# Patient Record
Sex: MALE | Race: BLACK OR AFRICAN AMERICAN | NOT HISPANIC OR LATINO | Employment: FULL TIME | URBAN - METROPOLITAN AREA
[De-identification: names, ages, dates, MRNs, and addresses within clinical notes are randomized per-mention and may not be internally consistent; named-entity substitution may affect disease eponyms.]

---

## 2021-09-04 ENCOUNTER — HOSPITAL ENCOUNTER (EMERGENCY)
Facility: HOSPITAL | Age: 21
Discharge: HOME | End: 2021-09-04
Attending: EMERGENCY MEDICINE

## 2021-09-04 ENCOUNTER — APPOINTMENT (EMERGENCY)
Dept: RADIOLOGY | Facility: HOSPITAL | Age: 21
End: 2021-09-04

## 2021-09-04 VITALS
HEIGHT: 71 IN | BODY MASS INDEX: 20.3 KG/M2 | SYSTOLIC BLOOD PRESSURE: 141 MMHG | TEMPERATURE: 97.8 F | OXYGEN SATURATION: 99 % | WEIGHT: 145 LBS | DIASTOLIC BLOOD PRESSURE: 87 MMHG | RESPIRATION RATE: 18 BRPM | HEART RATE: 83 BPM

## 2021-09-04 DIAGNOSIS — M54.6 ACUTE LEFT-SIDED THORACIC BACK PAIN: Primary | ICD-10-CM

## 2021-09-04 PROCEDURE — 96372 THER/PROPH/DIAG INJ SC/IM: CPT

## 2021-09-04 PROCEDURE — 99283 EMERGENCY DEPT VISIT LOW MDM: CPT

## 2021-09-04 PROCEDURE — 99284 EMERGENCY DEPT VISIT MOD MDM: CPT | Performed by: PHYSICIAN ASSISTANT

## 2021-09-04 PROCEDURE — 71045 X-RAY EXAM CHEST 1 VIEW: CPT

## 2021-09-04 RX ORDER — METHOCARBAMOL 500 MG/1
500 TABLET, FILM COATED ORAL ONCE
Status: COMPLETED | OUTPATIENT
Start: 2021-09-04 | End: 2021-09-04

## 2021-09-04 RX ORDER — KETOROLAC TROMETHAMINE 30 MG/ML
15 INJECTION, SOLUTION INTRAMUSCULAR; INTRAVENOUS ONCE
Status: COMPLETED | OUTPATIENT
Start: 2021-09-04 | End: 2021-09-04

## 2021-09-04 RX ORDER — NAPROXEN 500 MG/1
500 TABLET ORAL 2 TIMES DAILY WITH MEALS
Qty: 30 TABLET | Refills: 0 | Status: SHIPPED | OUTPATIENT
Start: 2021-09-04

## 2021-09-04 RX ORDER — METHOCARBAMOL 500 MG/1
500 TABLET, FILM COATED ORAL 2 TIMES DAILY
Qty: 20 TABLET | Refills: 0 | Status: SHIPPED | OUTPATIENT
Start: 2021-09-04

## 2021-09-04 RX ORDER — LIDOCAINE 50 MG/G
2 PATCH TOPICAL ONCE
Status: DISCONTINUED | OUTPATIENT
Start: 2021-09-04 | End: 2021-09-05 | Stop reason: HOSPADM

## 2021-09-04 RX ADMIN — LIDOCAINE 5% 2 PATCH: 700 PATCH TOPICAL at 21:37

## 2021-09-04 RX ADMIN — METHOCARBAMOL 500 MG: 500 TABLET ORAL at 21:37

## 2021-09-04 RX ADMIN — KETOROLAC TROMETHAMINE 15 MG: 30 INJECTION, SOLUTION INTRAMUSCULAR; INTRAVENOUS at 21:37

## 2021-09-05 NOTE — ED PROVIDER NOTES
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 that 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  He did not have any falls  He is not complaining of any fevers, chills, chest pain, difficulty breathing, saddle anesthesia, urinary/bowel incontinence  No signs of cauda equina  No other symptoms at this time  None       History reviewed  No pertinent past medical history  History reviewed  No pertinent surgical history  History reviewed  No pertinent family history  I have reviewed and agree with the history as documented  E-Cigarette/Vaping     E-Cigarette/Vaping Substances     Social History     Tobacco Use    Smoking status: Never Smoker    Smokeless tobacco: Never Used   Substance Use Topics    Alcohol use: Not Currently    Drug use: Not Currently       Review of Systems   Constitutional: Negative for chills, diaphoresis and fever  HENT: Negative for congestion, drooling, facial swelling, nosebleeds, sore throat and voice change  Eyes: Negative for discharge and redness  Respiratory: Negative for cough, choking, chest tightness, shortness of breath and stridor  Cardiovascular: Negative for chest pain and palpitations  Gastrointestinal: Negative for abdominal pain, diarrhea, nausea and vomiting  Genitourinary: Negative for decreased urine volume, difficulty urinating, dysuria, flank pain, frequency and urgency  Musculoskeletal: Positive for back pain  Negative for arthralgias, neck pain and neck stiffness  Skin: Negative for color change, rash and wound     Neurological: Negative for dizziness, syncope, facial asymmetry, weakness, light-headedness, numbness and headaches  Psychiatric/Behavioral: Negative for confusion and suicidal ideas  The patient is not nervous/anxious  All other systems reviewed and are negative  Physical Exam  Physical Exam  Vitals reviewed  Constitutional:       General: He is not in acute distress  Appearance: Normal appearance  He is normal weight  He is not ill-appearing, toxic-appearing or diaphoretic  HENT:      Head: Normocephalic and atraumatic  Right Ear: External ear normal       Left Ear: External ear normal       Mouth/Throat:      Mouth: Mucous membranes are moist       Pharynx: Oropharynx is clear  No oropharyngeal exudate or posterior oropharyngeal erythema  Eyes:      General: No scleral icterus  Right eye: No discharge  Left eye: No discharge  Extraocular Movements: Extraocular movements intact  Conjunctiva/sclera: Conjunctivae normal       Pupils: Pupils are equal, round, and reactive to light  Cardiovascular:      Rate and Rhythm: Normal rate and regular rhythm  Pulses: Normal pulses  Heart sounds: Normal heart sounds  No murmur heard  No friction rub  No gallop  Pulmonary:      Effort: Pulmonary effort is normal  No respiratory distress  Breath sounds: Normal breath sounds  No stridor  No wheezing, rhonchi or rales  Abdominal:      General: Abdomen is flat  Palpations: Abdomen is soft  Tenderness: There is no abdominal tenderness  There is no right CVA tenderness, left CVA tenderness, guarding or rebound  Musculoskeletal:      Cervical back: Normal range of motion and neck supple  Thoracic back: Tenderness (Left paraspinal muscles) present  Decreased range of motion  Right lower leg: No edema  Left lower leg: No edema  Skin:     General: Skin is warm and dry  Capillary Refill: Capillary refill takes less than 2 seconds  Neurological:      General: No focal deficit present        Mental Status: He is alert and oriented to person, place, and time  Psychiatric:         Mood and Affect: Mood normal          Behavior: Behavior normal          Vital Signs  ED Triage Vitals [09/04/21 2000]   Temperature Pulse Respirations Blood Pressure SpO2   97 8 °F (36 6 °C) 83 18 141/87 99 %      Temp Source Heart Rate Source Patient Position - Orthostatic VS BP Location FiO2 (%)   Temporal Monitor Sitting Left arm --      Pain Score       4           Vitals:    09/04/21 2000   BP: 141/87   Pulse: 83   Patient Position - Orthostatic VS: Sitting         Visual Acuity  Visual Acuity      Most Recent Value   L Pupil Size (mm)  3   R Pupil Size (mm)  3          ED Medications  Medications   lidocaine (LIDODERM) 5 % patch 2 patch (2 patches Topical Medication Applied 9/4/21 2137)   methocarbamol (ROBAXIN) tablet 500 mg (500 mg Oral Given 9/4/21 2137)   ketorolac (TORADOL) injection 15 mg (15 mg Intramuscular Given 9/4/21 2137)       Diagnostic Studies  Results Reviewed     None                 XR chest 1 view portable    (Results Pending)              Procedures  Procedures         ED Course                                           MDM  Number of Diagnoses or Management Options  Acute left-sided thoracic back pain  Diagnosis management comments: Patient is a 81-year-old male with no significant past medical history who presents to the emergency department for evaluation of left-sided thoracic back pain for the past month  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 that 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  He did not have any falls  He is not complaining of any fevers, chills, chest pain, difficulty breathing, saddle anesthesia, urinary/bowel incontinence  No signs of cauda equina  No other symptoms at this time  Patient appears comfortable in bed  He is not any acute distress    His vital signs are normal   Physical exam remarkable for left posterior chest wall tenderness as well as left thoracic paraspinal muscle tenderness  Remainder of physical exam unremarkable  Patient given Toradol, Robaxin, Lidoderm patch in the emergency department with moderate relief  Patient given prescription for naproxen and Robaxin  He was given referral to our [facility] program   Given very strict instructions on when to return to the emergency department  Patient stable at this time  Amount and/or Complexity of Data Reviewed  Tests in the radiology section of CPT®: ordered and reviewed    Patient Progress  Patient progress: stable      Disposition  Final diagnoses:   Acute left-sided thoracic back pain     Time reflects when diagnosis was documented in both MDM as applicable and the Disposition within this note     Time User Action Codes Description Comment    9/4/2021 10:12 PM Glenys Opitz Add [M54 6] Acute left-sided thoracic back pain       ED Disposition     ED Disposition Condition Date/Time Comment    Discharge Stable Sat Sep 4, 2021 10:12 PM Holly Whipple discharge to home/self care              Follow-up Information     Follow up With Specialties Details Why Contact Info Additional [address] [facility] Emergency Department Emergency Medicine Go to  If symptoms worsen [id number] [facility]  [id number] [place] [address] Emergency Department, [address] [place] [address], [id number]          Discharge Medication List as of 9/4/2021 10:15 PM      START taking these medications    Details   methocarbamol (ROBAXIN) 500 mg tablet Take 1 tablet (500 mg total) by mouth 2 (two) times a day, Starting Sat 9/4/2021, Normal      naproxen (NAPROSYN) 500 mg tablet Take 1 tablet (500 mg total) by mouth 2 (two) times a day with meals, Starting Sat 9/4/2021, Normal               PDMP Review     None          ED Provider  Electronically Signed by           Husam Baron PA-C  09/04/21 9791

## 2021-09-09 ENCOUNTER — TELEPHONE (OUTPATIENT)
Dept: PHYSICAL THERAPY | Facility: OTHER | Age: 21
End: 2021-09-09

## 2021-09-09 NOTE — TELEPHONE ENCOUNTER
Attempted to call patient per [facility] referral but unable to leave a voice message due to Voice Mailbox not set up  This is the 1st attempt to reach the patient  Will defer per protocol

## 2021-11-06 ENCOUNTER — HOSPITAL ENCOUNTER (EMERGENCY)
Facility: HOSPITAL | Age: 21
Discharge: HOME | End: 2021-11-06
Attending: EMERGENCY MEDICINE
Payer: COMMERCIAL

## 2021-11-06 VITALS
RESPIRATION RATE: 18 BRPM | OXYGEN SATURATION: 99 % | WEIGHT: 145 LBS | SYSTOLIC BLOOD PRESSURE: 135 MMHG | TEMPERATURE: 98.7 F | HEIGHT: 71 IN | BODY MASS INDEX: 20.3 KG/M2 | DIASTOLIC BLOOD PRESSURE: 66 MMHG | HEART RATE: 75 BPM

## 2021-11-06 DIAGNOSIS — R04.0 RECURRENT EPISTAXIS: Primary | ICD-10-CM

## 2021-11-06 PROCEDURE — 99283 EMERGENCY DEPT VISIT LOW MDM: CPT

## 2021-11-06 PROCEDURE — U0003 INFECTIOUS AGENT DETECTION BY NUCLEIC ACID (DNA OR RNA); SEVERE ACUTE RESPIRATORY SYNDROME CORONAVIRUS 2 (SARS-COV-2) (CORONAVIRUS DISEASE [COVID-19]), AMPLIFIED PROBE TECHNIQUE, MAKING USE OF HIGH THROUGHPUT TECHNOLOGIES AS DESCRIBED BY CMS-2020-01-R: HCPCS | Performed by: PHYSICIAN ASSISTANT

## 2021-11-06 PROCEDURE — 99284 EMERGENCY DEPT VISIT MOD MDM: CPT | Performed by: PHYSICIAN ASSISTANT

## 2021-11-06 PROCEDURE — U0005 INFEC AGEN DETEC AMPLI PROBE: HCPCS | Performed by: PHYSICIAN ASSISTANT

## 2021-11-06 RX ORDER — OXYMETAZOLINE HYDROCHLORIDE 0.05 G/100ML
2 SPRAY NASAL 2 TIMES DAILY
Qty: 30 ML | Refills: 0 | Status: SHIPPED | OUTPATIENT
Start: 2021-11-06

## 2021-11-07 LAB — SARS-COV-2 RNA RESP QL NAA+PROBE: NEGATIVE

## 2022-05-24 ENCOUNTER — HOSPITAL ENCOUNTER (EMERGENCY)
Facility: HOSPITAL | Age: 22
Discharge: HOME | End: 2022-05-25
Attending: EMERGENCY MEDICINE | Admitting: EMERGENCY MEDICINE
Payer: COMMERCIAL

## 2022-05-24 VITALS
DIASTOLIC BLOOD PRESSURE: 62 MMHG | HEART RATE: 68 BPM | OXYGEN SATURATION: 99 % | RESPIRATION RATE: 19 BRPM | SYSTOLIC BLOOD PRESSURE: 118 MMHG | TEMPERATURE: 97.5 F

## 2022-05-24 DIAGNOSIS — J02.9 PHARYNGITIS: Primary | ICD-10-CM

## 2022-05-24 PROCEDURE — 99284 EMERGENCY DEPT VISIT MOD MDM: CPT | Performed by: EMERGENCY MEDICINE

## 2022-05-24 PROCEDURE — 0241U HB NFCT DS VIR RESP RNA 4 TRGT: CPT | Performed by: PHYSICIAN ASSISTANT

## 2022-05-24 PROCEDURE — 99283 EMERGENCY DEPT VISIT LOW MDM: CPT

## 2022-05-24 NOTE — Clinical Note
Ajith Hope was seen and treated in our emergency department on 5/24/2022  Diagnosis:     Jay Spaulding  may return to work on return date  He may return on this date: 05/26/2022         If you have any questions or concerns, please don't hesitate to call        Lucas Alvarado MD    ______________________________           _______________          _______________  Hospital Representative                              Date                                Time

## 2022-05-24 NOTE — Clinical Note
Nancy Butchmax was seen and treated in our emergency department on 5/24/2022  Diagnosis:     Catarina Lezama  may return to work on return date  He may return on this date: 05/26/2022         If you have any questions or concerns, please don't hesitate to call        Lady Ni RN    ______________________________           _______________          _______________  [facility] Representative                              Date                                Time

## 2022-05-25 LAB
FLUAV RNA RESP QL NAA+PROBE: NEGATIVE
FLUBV RNA RESP QL NAA+PROBE: NEGATIVE
RSV RNA RESP QL NAA+PROBE: NEGATIVE
S PYO DNA THROAT QL NAA+PROBE: NOT DETECTED
SARS-COV-2 RNA RESP QL NAA+PROBE: NEGATIVE

## 2022-05-25 PROCEDURE — 87651 STREP A DNA AMP PROBE: CPT | Performed by: EMERGENCY MEDICINE

## 2022-05-25 RX ORDER — AMOXICILLIN 250 MG/1
500 CAPSULE ORAL ONCE
Status: COMPLETED | OUTPATIENT
Start: 2022-05-25 | End: 2022-05-25

## 2022-05-25 RX ORDER — AMOXICILLIN 500 MG/1
500 CAPSULE ORAL 3 TIMES DAILY
Qty: 21 CAPSULE | Refills: 0 | Status: SHIPPED | OUTPATIENT
Start: 2022-05-25 | End: 2022-06-01

## 2022-05-25 RX ADMIN — DEXAMETHASONE SODIUM PHOSPHATE 10 MG: 10 INJECTION, SOLUTION INTRAMUSCULAR; INTRAVENOUS at 00:24

## 2022-05-25 RX ADMIN — AMOXICILLIN 500 MG: 250 CAPSULE ORAL at 00:24

## 2022-05-25 NOTE — ED PROVIDER NOTES
History  Chief Complaint   Patient presents with    Sore Throat     Sore throat for several days with headache and "my neck feels stiff"  History provided by:  Patient   used: No    Sore Throat  Location:  Generalized  Quality:  Aching  Severity:  Moderate  Onset quality:  Gradual  Timing:  Constant  Progression:  Worsening  Chronicity:  New  Relieved by:  Nothing  Worsened by:  Nothing  Ineffective treatments:  None tried  Associated symptoms: no abdominal pain, no chest pain, no chills, no cough, no ear pain, no fever, no rash and no shortness of breath        Prior to Admission Medications   Prescriptions Last Dose Informant Patient Reported? Taking? methocarbamol (ROBAXIN) 500 mg tablet   No No   Sig: Take 1 tablet (500 mg total) by mouth 2 (two) times a day   naproxen (NAPROSYN) 500 mg tablet   No No   Sig: Take 1 tablet (500 mg total) by mouth 2 (two) times a day with meals   oxymetazoline (AFRIN) 0 05 % nasal spray   No No   Si sprays by Each Nare route 2 (two) times a day   sodium chloride (OCEAN) 0 65 % nasal spray   No No   Si spray into each nostril as needed for congestion      Facility-Administered Medications: None       Past Medical History:   Diagnosis Date    Scoliosis        History reviewed  No pertinent surgical history  History reviewed  No pertinent family history  I have reviewed and agree with the history as documented  E-Cigarette/Vaping    E-Cigarette Use Never User      E-Cigarette/Vaping Substances     Social History     Tobacco Use    Smoking status: Never Smoker    Smokeless tobacco: Never Used   Vaping Use    Vaping Use: Never used   Substance Use Topics    Alcohol use: Not Currently    Drug use: Not Currently       Review of Systems   Constitutional: Negative for chills and fever  HENT: Positive for sore throat  Negative for ear pain  Eyes: Negative for pain and visual disturbance     Respiratory: Negative for cough and shortness of breath  Cardiovascular: Negative for chest pain and palpitations  Gastrointestinal: Negative for abdominal pain, nausea and vomiting  Genitourinary: Negative for dysuria and hematuria  Musculoskeletal: Negative for arthralgias and back pain  Skin: Negative for color change and rash  Neurological: Negative for seizures and syncope  All other systems reviewed and are negative  Physical Exam  Physical Exam  Vitals and nursing note reviewed  Constitutional:       Appearance: He is well-developed  HENT:      Head: Normocephalic and atraumatic  Mouth/Throat: Tonsils: Tonsillar exudate present  1+ on the right  1+ on the left  Eyes:      Conjunctiva/sclera: Conjunctivae normal    Cardiovascular:      Rate and Rhythm: Normal rate and regular rhythm  Heart sounds: No murmur heard  Pulmonary:      Effort: Pulmonary effort is normal  No respiratory distress  Breath sounds: Normal breath sounds  Abdominal:      Palpations: Abdomen is soft  Tenderness: There is no abdominal tenderness  Musculoskeletal:      Cervical back: Neck supple  Skin:     General: Skin is warm and dry  Capillary Refill: Capillary refill takes less than 2 seconds  Neurological:      General: No focal deficit present  Mental Status: He is alert and oriented to person, place, and time           Vital Signs  ED Triage Vitals [05/24/22 2331]   Temperature Pulse Respirations Blood Pressure SpO2   97 5 °F (36 4 °C) 68 19 118/62 99 %      Temp Source Heart Rate Source Patient Position - Orthostatic VS BP Location FiO2 (%)   Temporal Monitor Sitting Left arm --      Pain Score       --           Vitals:    05/24/22 2331   BP: 118/62   Pulse: 68   Patient Position - Orthostatic VS: Sitting         Visual Acuity      ED Medications  Medications   dexamethasone oral liquid 10 mg 1 mL (10 mg Oral Given 5/25/22 0024)   amoxicillin (AMOXIL) capsule 500 mg (500 mg Oral Given 5/25/22 0024) Diagnostic Studies  Results Reviewed     Procedure Component Value Units Date/Time    Strep A PCR [[id number]]  (Normal) Collected: 05/25/22 0026    Lab Status: Final result Specimen: Throat Updated: 05/25/22 0056     STREP A PCR Not Detected    COVID/FLU/RSV [[id number]]  (Normal) Collected: 05/24/22 2333    Lab Status: Final result Specimen: Nares from Nose Updated: 05/25/22 0014     SARS-CoV-2 Negative     INFLUENZA A PCR Negative     INFLUENZA B PCR Negative     RSV PCR Negative    Narrative:      FOR PEDIATRIC PATIENTS - copy/paste COVID Guidelines URL to browser: [email or web address]    SARS-CoV-2 assay is a Nucleic Acid Amplification assay intended for the  qualitative detection of nucleic acid from SARS-CoV-2 in nasopharyngeal  swabs  Results are for the presumptive identification of SARS-CoV-2 RNA  Positive results are indicative of infection with SARS-CoV-2, the virus  causing COVID-19, but do not rule out bacterial infection or co-infection  with other viruses  Laboratories within the [place] and its  territories are required to report all positive results to the appropriate  public health authorities  Negative results do not preclude SARS-CoV-2  infection and should not be used as the sole basis for treatment or other  patient management decisions  Negative results must be combined with  clinical observations, patient history, and epidemiological information  This test has not been FDA cleared or approved  This test has been authorized by FDA under an Emergency Use Authorization  (EUA)  This test is only authorized for the duration of time the  declaration that circumstances exist justifying the authorization of the  emergency use of an in vitro diagnostic tests for detection of SARS-CoV-2  virus and/or diagnosis of COVID-19 infection under section 564(b)(1) of  the Act, 21 U  S C  386PDJ-6(M)(1), unless the authorization is terminated  or revoked sooner  The test has been validated but independent review by FDA  and CLIA is pending  Test performed using [employer] Gene[employer]pert: This RT-PCR assay targets N2,  a region unique to SARS-CoV-2  A conserved region in the E-gene was chosen  for pan-Sarbecovirus detection which includes SARS-CoV-2  No orders to display              Procedures  Procedures         ED Course                               SBIRT 20yo+    Flowsheet Row Most Recent Value   SBIRT (23 yo +)    In order to provide better care to our patients, we are screening all of our patients for alcohol and drug use  Would it be okay to ask you these screening questions? Yes Filed at: 05/25/2022 0010   Initial Alcohol Screen: US AUDIT-C     1  How often do you have a drink containing alcohol? 2 Filed at: 05/25/2022 0010   2  How many drinks containing alcohol do you have on a typical day you are drinking? 1 Filed at: 05/25/2022 0010   3a  Male UNDER 65: How often do you have five or more drinks on one occasion? 0 Filed at: 05/25/2022 0010   Audit-C Score 3 Filed at: 05/25/2022 0010   LETHA: How many times in the past year have you    Used an illegal drug or used a prescription medication for non-medical reasons? Never Filed at: 05/25/2022 0010                    MDM  Number of Diagnoses or Management Options  Pharyngitis: new and requires workup  Diagnosis management comments: 28-year-old male presented for evaluation of sore throat for a few days  Reports a mild discomfort with swallowing but no difficulty handling secretions or liquids  no shortness of breath or cp  Pt unsure of fevers  Ill-appearing on exam, reassuring vital signs  COVID and flu negative  Will plan to treat for strep pharyngitis given exudates and lymphadenopathy on exam   Discussed PCP follow-up and return precautions        Disposition  Final diagnoses:   Pharyngitis     Time reflects when diagnosis was documented in both MDM as applicable and the Disposition within this note     Time User Action Codes Description Comment    5/25/2022 12:39 AM Deandre Godoy Add [J02 9] Pharyngitis       ED Disposition     ED Disposition   Discharge    Condition   Stable    Date/Time   Wed May 25, 2022 [address] discharge to home/self care  Follow-up Information     Follow up With Specialties Details Why Jeanine Tyler MD Internal Medicine   [facility] [address]  [phone]            Discharge Medication List as of 5/25/2022 12:40 AM      START taking these medications    Details   amoxicillin (AMOXIL) 500 mg capsule Take 1 capsule (500 mg total) by mouth in the morning and 1 capsule (500 mg total) in the evening and 1 capsule (500 mg total) before bedtime  Do all this for 7 days  , Starting Wed 5/25/2022, Until Wed 6/1/2022, Normal         CONTINUE these medications which have NOT CHANGED    Details   methocarbamol (ROBAXIN) 500 mg tablet Take 1 tablet (500 mg total) by mouth 2 (two) times a day, Starting Sat 9/4/2021, Normal      naproxen (NAPROSYN) 500 mg tablet Take 1 tablet (500 mg total) by mouth 2 (two) times a day with meals, Starting Sat 9/4/2021, Normal      oxymetazoline (AFRIN) 0 05 % nasal spray 2 sprays by Each Nare route 2 (two) times a day, Starting Sat 11/6/2021, Print      sodium chloride (OCEAN) 0 65 % nasal spray 1 spray into each nostril as needed for congestion, Starting Sat 11/6/2021, Print             No discharge procedures on file      PDMP Review     None          ED Provider  Electronically Signed by           Zackery Hillman MD  05/25/22 6471

## 2022-06-21 ENCOUNTER — APPOINTMENT (EMERGENCY)
Dept: CT IMAGING | Facility: HOSPITAL | Age: 22
End: 2022-06-21
Payer: COMMERCIAL

## 2022-06-21 ENCOUNTER — HOSPITAL ENCOUNTER (EMERGENCY)
Facility: HOSPITAL | Age: 22
Discharge: HOME | End: 2022-06-21
Attending: EMERGENCY MEDICINE
Payer: COMMERCIAL

## 2022-06-21 VITALS
TEMPERATURE: 97.7 F | DIASTOLIC BLOOD PRESSURE: 58 MMHG | OXYGEN SATURATION: 99 % | RESPIRATION RATE: 18 BRPM | HEART RATE: 55 BPM | SYSTOLIC BLOOD PRESSURE: 127 MMHG

## 2022-06-21 DIAGNOSIS — S09.90XA INJURY OF HEAD, INITIAL ENCOUNTER: Primary | ICD-10-CM
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
## 2022-06-21 DIAGNOSIS — M54.2 NECK PAIN: ICD-10-CM

## 2022-06-21 PROCEDURE — 70450 CT HEAD/BRAIN W/O DYE: CPT

## 2022-06-21 PROCEDURE — 99284 EMERGENCY DEPT VISIT MOD MDM: CPT

## 2022-06-21 PROCEDURE — 72125 CT NECK SPINE W/O DYE: CPT

## 2022-06-21 PROCEDURE — 99284 EMERGENCY DEPT VISIT MOD MDM: CPT | Performed by: EMERGENCY MEDICINE

## 2022-06-21 NOTE — DISCHARGE INSTRUCTIONS
Take motrin or tylenol over the counter for neck pain, follow up with your primary care doctor for recheck of nodule along ventricle of brain which may need MRI to further evaluate
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
## 2022-06-29 ENCOUNTER — HOSPITAL ENCOUNTER (EMERGENCY)
Facility: HOSPITAL | Age: 22
Discharge: HOME | End: 2022-06-29
Attending: EMERGENCY MEDICINE
Payer: COMMERCIAL

## 2022-06-29 VITALS
OXYGEN SATURATION: 100 % | TEMPERATURE: 97.3 F | SYSTOLIC BLOOD PRESSURE: 130 MMHG | DIASTOLIC BLOOD PRESSURE: 68 MMHG | RESPIRATION RATE: 16 BRPM | HEART RATE: 72 BPM

## 2022-06-29 DIAGNOSIS — R04.0 ACUTE ANTERIOR EPISTAXIS: Primary | ICD-10-CM

## 2022-06-29 PROCEDURE — 99284 EMERGENCY DEPT VISIT MOD MDM: CPT | Performed by: EMERGENCY MEDICINE

## 2022-06-29 PROCEDURE — 30901 CONTROL OF NOSEBLEED: CPT | Performed by: EMERGENCY MEDICINE

## 2022-06-29 PROCEDURE — 99283 EMERGENCY DEPT VISIT LOW MDM: CPT

## 2022-06-29 RX ADMIN — SILVER NITRATE APPLICATORS 1 APPLICATOR: 25; 75 STICK TOPICAL at 13:31

## 2022-06-29 NOTE — ED PROVIDER NOTES
Pt Name: Montserrat Alves  MRN: [id number]  [place] 2000  Age/Sex: 25 y o  male  Date of evaluation: 6/29/2022  PCP: No primary care provider on file  CHIEF COMPLAINT    Chief Complaint   Patient presents with    Nose Bleed     Pt reports left nostril nose bleed on and off since Monday  No active bleed  HPI    25 y o  male presenting with nose bleeds  Patient states that since Monday he has had intermittent bleeding from left nostril  He states the bleed seems to come on without provocation, last about 5 minutes before stopping on its own  He has not been treating it in any way  He states that since it came back again this morning he felt it was time to get checked out  He denies numbness, weakness, chest pain, shortness of breath, fevers, other symptoms  HPI      Past Medical and Surgical History    Past Medical History:   Diagnosis Date    Scoliosis        History reviewed  No pertinent surgical history  History reviewed  No pertinent family history  Social History     Tobacco Use    Smoking status: Never Smoker    Smokeless tobacco: Never Used   Vaping Use    Vaping Use: Never used   Substance Use Topics    Alcohol use: Not Currently    Drug use: Not Currently           Allergies    No Known Allergies    Home Medications    Prior to Admission medications    Medication Sig Start Date End Date Taking?  Authorizing Provider   methocarbamol (ROBAXIN) 500 mg tablet Take 1 tablet (500 mg total) by mouth 2 (two) times a day 9/4/21   Colleen Duval PA-C   naproxen (NAPROSYN) 500 mg tablet Take 1 tablet (500 mg total) by mouth 2 (two) times a day with meals 9/4/21   Colleen Duval PA-C   oxymetazoline (AFRIN) 0 05 % nasal spray 2 sprays by Each Nare route 2 (two) times a day 11/6/21   Dulce Maria Muir PA-C   sodium chloride (OCEAN) 0 65 % nasal spray 1 spray into each nostril as needed for congestion 11/6/21   Dulce Maria Muir PA-C           Review of Systems    Review of Systems   Constitutional: Negative for appetite change, chills and diaphoresis  HENT: Positive for nosebleeds  Negative for drooling, facial swelling, trouble swallowing and voice change  Respiratory: Negative for apnea, shortness of breath and wheezing  Cardiovascular: Negative for chest pain and leg swelling  Gastrointestinal: Negative for abdominal distention, abdominal pain, diarrhea, nausea and vomiting  Genitourinary: Negative for dysuria and urgency  Musculoskeletal: Negative for arthralgias, back pain, gait problem and neck pain  Skin: Negative for color change, rash and wound  Neurological: Negative for seizures, speech difficulty, weakness and headaches  Psychiatric/Behavioral: Negative for agitation, behavioral problems and dysphoric mood  The patient is not nervous/anxious  All other systems reviewed and negative  Physical Exam      ED Triage Vitals [06/29/22 1309]   Temperature Pulse Respirations Blood Pressure SpO2   (!) 97 3 °F (36 3 °C) 72 16 130/68 100 %      Temp Source Heart Rate Source Patient Position - Orthostatic VS BP Location FiO2 (%)   Temporal Monitor Sitting Left arm --      Pain Score       --               Physical Exam  Vitals and nursing note reviewed  Constitutional:       Appearance: He is well-developed  HENT:      Head: Normocephalic and atraumatic  Right Ear: External ear normal       Left Ear: External ear normal       Nose:      Comments: Small blood noted in the left nostril consistent with anterior nose bleed  Minimal bleeding at time of examination but slight ooze noted  Eyes:      Conjunctiva/sclera: Conjunctivae normal       Pupils: Pupils are equal, round, and reactive to light  Neck:      Trachea: No tracheal deviation  Cardiovascular:      Rate and Rhythm: Normal rate and regular rhythm  Heart sounds: Normal heart sounds  No murmur heard  Pulmonary:      Effort: Pulmonary effort is normal  No respiratory distress  Breath sounds: Normal breath sounds  No stridor  No wheezing or rales  Abdominal:      General: There is no distension  Palpations: Abdomen is soft  Tenderness: There is no abdominal tenderness  There is no guarding or rebound  Musculoskeletal:         General: No deformity  Normal range of motion  Cervical back: Normal range of motion and neck supple  Skin:     General: Skin is warm and dry  Findings: No rash  Neurological:      Mental Status: He is alert and oriented to person, place, and time  Psychiatric:         Behavior: Behavior normal          Thought Content: Thought content normal          Judgment: Judgment normal               Diagnostic Results      Labs:    Results Reviewed     None          All labs reviewed and utilized in the medical decision making process    Radiology:    No orders to display       All radiology studies independently viewed by me and interpreted by the radiologist     Procedure    Epistaxis management    Date/Time: 6/29/2022 1:30 PM  Performed by: Mira Willingham MD  Authorized by: Mira Willingham MD   Universal Protocol:  Consent: Verbal consent obtained  Risks and benefits: risks, benefits and alternatives were discussed  Consent given by: patient  Time out: Immediately prior to procedure a "time out" was called to verify the correct patient, procedure, equipment, support staff and site/side marked as required  Patient identity confirmed: provided demographic data      Patient location:  ED  Procedure details:     Treatment site:  L anterior    Hemostasis method:  Silver nitrate    Approach:  External    Spec Headlamp used: No      Treatment complexity:  Limited    Treatment episode: initial    Post-procedure details:     Assessment:  Bleeding stopped    Patient tolerance of procedure:   Tolerated well, no immediate complications            ED Course of Care and Re-Assessments      Patient observed after cautery without recurrence of nosebleed  Medications   silver nitrate-potassium nitrate (ARZOL SILVER NITRATE) 30-60 % applicator 1 applicator (1 applicator Topical Given 6/29/22 1331)           FINAL IMPRESSION    Final diagnoses:   Acute anterior epistaxis         DISPOSITION/PLAN    Presentation as above intermittent recurrent nose bleed  Vital signs reassuring, examination consistent with left anterior epistaxis as above  Silver nitrate cautery performed with resolution of symptoms  Counseled regarding further conservative therapy for nosebleeds at home  Discharged strict return precautions, follow up primary care doctor  Referred to ENT as needed should this become a recurrent or chronic problem  Time reflects when diagnosis was documented in both MDM as applicable and the Disposition within this note     Time User Action Codes Description Comment    6/29/2022  1:37 PM Luisito Seth Add [R04 0] Acute anterior epistaxis       ED Disposition     ED Disposition   Discharge    Condition   Stable    Date/Time   Wed Jun 29, 2022  1:37 PM    Comment   Roosevelt Olvera discharge to home/self care  Follow-up Information     Follow up With Specialties Details Why Contact Info Additional [address] [facility] Department Emergency Medicine Go to  If symptoms worsen [address] [facility] [address]  [address] [facility] Emergency Department, [address] [place], [place], [address] Throat Otolaryngology Call  As needed if your nose bleed keeps coming back [address] [facility] 5 Counts include 234 beds at the [facility], [address] [place] [address] [address] [place] [address]            PATIENT REFERRED TO:    [address] [place] [address] Emergency Department  [address] [facility] [address] [phone]  Go to   If symptoms worsen    [facility]  [address] [facility] [address] [phone]  Call   As needed if your nose bleed keeps coming back      DISCHARGE MEDICATIONS:    Discharge Medication List as of 6/29/2022  1:41 PM      CONTINUE these medications which have NOT CHANGED    Details   methocarbamol (ROBAXIN) 500 mg tablet Take 1 tablet (500 mg total) by mouth 2 (two) times a day, Starting Sat 9/4/2021, Normal      naproxen (NAPROSYN) 500 mg tablet Take 1 tablet (500 mg total) by mouth 2 (two) times a day with meals, Starting Sat 9/4/2021, Normal      oxymetazoline (AFRIN) 0 05 % nasal spray 2 sprays by Each Nare route 2 (two) times a day, Starting Sat 11/6/2021, Print      sodium chloride (OCEAN) 0 65 % nasal spray 1 spray into each nostril as needed for congestion, Starting Sat 11/6/2021, Print             No discharge procedures on file  Ankit Garcia MD    Portions of the record may have been created with voice recognition software  Occasional wrong word or "sound alike" substitutions may have occurred due to the inherent limitations of voice recognition software    Please read the chart carefully and recognize, using context, where substitutions have occurred     Ankit Garcia MD  06/29/22 [id number]

## 2022-12-28 ENCOUNTER — HOSPITAL ENCOUNTER (EMERGENCY)
Facility: HOSPITAL | Age: 22
Discharge: HOME | End: 2022-12-28
Attending: EMERGENCY MEDICINE

## 2022-12-28 VITALS
RESPIRATION RATE: 15 BRPM | OXYGEN SATURATION: 99 % | HEART RATE: 66 BPM | SYSTOLIC BLOOD PRESSURE: 151 MMHG | TEMPERATURE: 97.8 F | DIASTOLIC BLOOD PRESSURE: 66 MMHG

## 2022-12-28 DIAGNOSIS — S16.1XXA STRAIN OF NECK MUSCLE, INITIAL ENCOUNTER: Primary | ICD-10-CM

## 2022-12-28 NOTE — ED PROVIDER NOTES
History  Chief Complaint   Patient presents with   • Neck Pain     Patient with right lateral neck pain for 10 days that has been improving but needs clearance to return to work  Full ROM, CMS, no numbness, tingling to right arm     HPI the patient is a 81-year-old male reports neck pain over the last 7 to 10 days  Patient reports he developed a soreness in his right neck, difficult to move his neck to the outside and with bending  He reports now the soreness and pain seems to be improved  Denies any focal weakness  Denies any numbness or tingling in his extremities  Denies any injury  Patient reports he has scoliosis intermittently gets back and neck pain  Patient reports he was out of work due to the neck pain and now wants to return to work that his neck pain is improving but needs a note to return to work  Denies any pain with range of motion  He reports pain primarily with some lifting and turning excessively  Ports he feels safe to return to work as a   José Miguel history of scoliosis  Family history contributory none social history, opioid, non-smoker    Prior to Admission Medications   Prescriptions Last Dose Informant Patient Reported? Taking? methocarbamol (ROBAXIN) 500 mg tablet   No No   Sig: Take 1 tablet (500 mg total) by mouth 2 (two) times a day   naproxen (NAPROSYN) 500 mg tablet   No No   Sig: Take 1 tablet (500 mg total) by mouth 2 (two) times a day with meals   oxymetazoline (AFRIN) 0 05 % nasal spray   No No   Si sprays by Each Nare route 2 (two) times a day   sodium chloride (OCEAN) 0 65 % nasal spray   No No   Si spray into each nostril as needed for congestion      Facility-Administered Medications: None       Past Medical History:   Diagnosis Date   • Scoliosis        History reviewed  No pertinent surgical history  History reviewed  No pertinent family history  I have reviewed and agree with the history as documented      E-Cigarette/Vaping   • E-Cigarette Use Never User      E-Cigarette/Vaping Substances     Social History     Tobacco Use   • Smoking status: Never   • Smokeless tobacco: Never   Vaping Use   • Vaping Use: Never used   Substance Use Topics   • Alcohol use: Not Currently   • Drug use: Not Currently       Review of Systems   Constitutional: Negative for fever  HENT: Negative for congestion  Eyes: Negative for pain and redness  Respiratory: Negative for cough and shortness of breath  Cardiovascular: Negative for chest pain  Gastrointestinal: Negative for abdominal pain and vomiting  Musculoskeletal: Positive for neck pain  Physical Exam  Physical Exam  Vitals and nursing note reviewed  Constitutional:       Appearance: He is well-developed  HENT:      Head: Normocephalic  Right Ear: External ear normal       Left Ear: External ear normal       Nose: Nose normal    Eyes:      General: Lids are normal       Pupils: Pupils are equal, round, and reactive to light  Neck:      Comments: Mild tenderness over the right trapezius, full range of motion is possible does neurovascular tendon intact, no focal weakness  Pulmonary:      Effort: Pulmonary effort is normal  No respiratory distress  Musculoskeletal:         General: No deformity  Normal range of motion  Cervical back: Normal range of motion and neck supple  Skin:     General: Skin is warm and dry  Neurological:      Mental Status: He is alert and oriented to person, place, and time           Vital Signs  ED Triage Vitals [12/28/22 1139]   Temperature Pulse Respirations Blood Pressure SpO2   97 8 °F (36 6 °C) 66 15 151/66 99 %      Temp Source Heart Rate Source Patient Position - Orthostatic VS BP Location FiO2 (%)   Tympanic Monitor Sitting Left arm --      Pain Score       --           Vitals:    12/28/22 1139   BP: 151/66   Pulse: 66   Patient Position - Orthostatic VS: Sitting         Visual Acuity      ED Medications  Medications - No data to display    Diagnostic Studies  Results Reviewed     None                 No orders to display              Procedures  Procedures         ED Course                                             MDMMedical decision making 67-year-old male presents emergency department with a cervical strain, now seems improved wants to return to work  I gave the patient a note for work  We discussed [facility] for recurrent neck or back pain  There is no focal weakness at this time no indication for imaging or further diagnostic testing here  Disposition  Final diagnoses:   Strain of neck muscle, initial encounter     Time reflects when diagnosis was documented in both MDM as applicable and the Disposition within this note     Time User Action Codes Description Comment    12/28/2022 11:49 AM Thomas Hodge Add [S16  1XXA] Strain of neck muscle, initial encounter       ED Disposition     ED Disposition   Discharge    Condition   Stable    Date/Time   Wed Dec 28, 2022 11:49 AM    Lisa Peres discharge to home/self care  Follow-up Information     Follow up With Specialties Details Why Contact Info Additional Information    [facility]   [phone]          Patient's Medications   Discharge Prescriptions    No medications on file       No discharge procedures on file      PDMP Review     None          ED Provider  Electronically Signed by           Onesimo Oakley MD  12/28/22 6878

## 2022-12-28 NOTE — Clinical Note
Marquis Pimentel was seen and treated in our emergency department on 12/28/2022  Diagnosis:     Julisa Collins  may return to work on return date  He may return on this date: If you have any questions or concerns, please don't hesitate to call        Ronda Hart MD    ______________________________           _______________          _______________  Hospital Representative                              Date                                Time

## 2022-12-28 NOTE — DISCHARGE INSTRUCTIONS
Heat to the area will help  Tylenol and Motrin as needed  Follow-up with the [facility] as needed

## 2024-01-09 ENCOUNTER — HOSPITAL ENCOUNTER (EMERGENCY)
Facility: HOSPITAL | Age: 24
Discharge: HOME | End: 2024-01-10
Attending: EMERGENCY MEDICINE
Payer: COMMERCIAL

## 2024-01-09 VITALS
HEIGHT: 72 IN | DIASTOLIC BLOOD PRESSURE: 59 MMHG | SYSTOLIC BLOOD PRESSURE: 120 MMHG | TEMPERATURE: 98.3 F | BODY MASS INDEX: 20.99 KG/M2 | OXYGEN SATURATION: 100 % | WEIGHT: 155 LBS | HEART RATE: 71 BPM | RESPIRATION RATE: 18 BRPM

## 2024-01-09 DIAGNOSIS — S05.00XA CORNEAL ABRASION: Primary | ICD-10-CM

## 2024-01-09 PROCEDURE — 99282 EMERGENCY DEPT VISIT SF MDM: CPT

## 2024-01-09 PROCEDURE — 99284 EMERGENCY DEPT VISIT MOD MDM: CPT

## 2024-01-09 RX ORDER — TETRACAINE HYDROCHLORIDE 5 MG/ML
2 SOLUTION OPHTHALMIC ONCE
Status: COMPLETED | OUTPATIENT
Start: 2024-01-09 | End: 2024-01-09

## 2024-01-09 RX ADMIN — FLUORESCEIN SODIUM 1 STRIP: 1 STRIP OPHTHALMIC at 23:25

## 2024-01-09 RX ADMIN — TETRACAINE HYDROCHLORIDE 2 DROP: 5 SOLUTION OPHTHALMIC at 23:25

## 2024-01-10 RX ORDER — ERYTHROMYCIN 5 MG/G
0.5 OINTMENT OPHTHALMIC ONCE
Status: COMPLETED | OUTPATIENT
Start: 2024-01-10 | End: 2024-01-10

## 2024-01-10 RX ORDER — ERYTHROMYCIN 5 MG/G
OINTMENT OPHTHALMIC 4 TIMES DAILY
Qty: 3.5 G | Refills: 0 | Status: SHIPPED | OUTPATIENT
Start: 2024-01-10

## 2024-01-10 RX ADMIN — ERYTHROMYCIN 0.5 INCH: 5 OINTMENT OPHTHALMIC at 00:17

## 2024-01-10 NOTE — ED PROVIDER NOTES
History  Chief Complaint   Patient presents with    Foreign Body in Eye     Patient reports was working with wood this afternoon and he believes he got wood in his left eye. Patient can't see it but can feel it.      The patient is a 23 y.o. male with a history of scoliosis who presents to [facility] with a chief complaint of left eye pain. Symptoms began 4pm and have been constant since onset. His pain is currently rated as a 4/10 in severity and described as irritation without radiation. Associated symptoms include tearing, foreign body sensation. Symptoms are aggravated with blinking and moving eye and alleviating factors include none noted. The patient denies fever, chills, night sweats, photophobia, blurred vision, headache, dizziness, lightheadedness, syncope, inability to open eye, pain with eye movement. He reports taking nothing prior to arrival. No other reported symptoms at this time.  Patient denies allergies to anything  Patient reports that he works with dust and particles of wood. He reports that he felt something in his eye and tried to rinse it well with water at home. He reports that he feels as thought the is something still in his eye but he cannot see anything there.       History provided by:  Patient   used: No    Foreign Body in Eye  Associated symptoms: no abdominal pain, no cough, no ear pain, no sore throat and no vomiting        Prior to Admission Medications   Prescriptions Last Dose Informant Patient Reported? Taking?   methocarbamol (ROBAXIN) 500 mg tablet   No No   Sig: Take 1 tablet (500 mg total) by mouth 2 (two) times a day   naproxen (NAPROSYN) 500 mg tablet   No No   Sig: Take 1 tablet (500 mg total) by mouth 2 (two) times a day with meals   oxymetazoline (AFRIN) 0.05 % nasal spray   No No   Si sprays by Each Nare route 2 (two) times a day   sodium chloride (OCEAN) 0.65 % nasal spray   No No   Si spray into each nostril as needed for  congestion      Facility-Administered Medications: None       Past Medical History:   Diagnosis Date    Scoliosis        History reviewed. No pertinent surgical history.    History reviewed. No pertinent family history.  I have reviewed and agree with the history as documented.    E-Cigarette/Vaping    E-Cigarette Use Never User      E-Cigarette/Vaping Substances     Social History     Tobacco Use    Smoking status: Never    Smokeless tobacco: Never   Vaping Use    Vaping status: Never Used   Substance Use Topics    Alcohol use: Not Currently    Drug use: Not Currently       Review of Systems   Constitutional:  Negative for chills and fever.   HENT:  Negative for ear pain and sore throat.    Eyes:  Positive for pain and redness. Negative for photophobia, itching and visual disturbance.        Increased tearing   Respiratory:  Negative for cough and shortness of breath.    Cardiovascular:  Negative for chest pain and palpitations.   Gastrointestinal:  Negative for abdominal pain and vomiting.   Genitourinary:  Negative for dysuria and hematuria.   Musculoskeletal:  Negative for arthralgias and back pain.   Skin:  Negative for color change and rash.   Neurological:  Negative for seizures and syncope.   All other systems reviewed and are negative.      Physical Exam  Physical Exam  Vitals reviewed.   Constitutional:       General: He is not in acute distress.     Appearance: Normal appearance. He is not ill-appearing.   HENT:      Head: Normocephalic and atraumatic.   Eyes:      General: Lids are normal. Lids are everted, no foreign bodies appreciated. Vision grossly intact. Gaze aligned appropriately. No allergic shiner, visual field deficit or scleral icterus.        Left eye: No foreign body, discharge or hordeolum.      Intraocular pressure: Left eye pressure is 10 mmHg. Measurements were taken using an automated tonometer.     Extraocular Movements: Extraocular movements intact.      Left eye: Normal extraocular  motion.      Conjunctiva/sclera:      Left eye: Left conjunctiva is injected. No chemosis, exudate or hemorrhage.     Pupils: Pupils are equal, round, and reactive to light. Pupils are equal.      Visual Fields: Right eye visual fields normal and left eye visual fields normal.        Comments: Visual acuity with both eyes 20/20, right eye 20/20, left eye 20/20, no visual field deficits, pH 7 with pH paper, on fluorescein stain exam corneal abrasion visualized at the 10-11 o'clock position with negative Jose's sign, pressure 10, POCUS exam done by myself shows no detached retina   Cardiovascular:      Rate and Rhythm: Normal rate.   Pulmonary:      Effort: Pulmonary effort is normal.   Neurological:      Mental Status: He is alert.         Vital Signs  ED Triage Vitals [01/09/24 2257]   Temperature Pulse Respirations Blood Pressure SpO2   98.3 °F (36.8 °C) 71 18 120/59 100 %      Temp Source Heart Rate Source Patient Position - Orthostatic VS BP Location FiO2 (%)   Temporal Monitor Sitting Left arm --      Pain Score       --           Vitals:    01/09/24 2257   BP: 120/59   Pulse: 71   Patient Position - Orthostatic VS: Sitting         Visual Acuity  Visual Acuity      Flowsheet Row Most Recent Value   L Pupil Size (mm) 3   R Pupil Size (mm) 3            ED Medications  Medications   tetracaine 0.5 % ophthalmic solution 2 drop (2 drops Left Eye Given by Other 1/9/24 8876)   fluorescein sodium sterile ophthalmic strip 1 strip (1 strip Left Eye Given by Other 1/9/24 0432)   erythromycin (ILOTYCIN) 0.5 % ophthalmic ointment 0.5 inch (0.5 inches Left Eye Given 1/10/24 0017)       Diagnostic Studies  Results Reviewed       None                   No orders to display              Procedures  Procedures         ED Course               Medical Decision Making  SUBJECTIVE: The patient suffered a left corneal abrasion 5 hour(s) ago. Mechanism of injury: dust or wood shaving particles.    OBJECTIVE: He appears well, vitals  are normal. Corneal abrasion noted right eye, 10-11 o'clock position. ZURI, fundi normal. Visual acuity as noted.    ASSESSMENT: Corneal abrasion  Exam benign, visual acuity and fields normal with no visualized foreign body including on eversion of the upper eyelid, normal pH and pressure, POCUS exam negative for detached retina, fluorescein exam showed small corneal abrasion with negative kamilla's sign    PLAN: Patient does not wear contacts or use glasses. Erythromycin ointment dose given here with prescription sent to the pharmacy. Ophthalmology follow up given. Patient will continue antibiotic ointment and follow up with ophthalmology. Patient given strict return parameters. He understands treatment plan and follow up and agrees.     Problems Addressed:  Corneal abrasion: acute illness or injury    Risk  Prescription drug management.             Disposition  Final diagnoses:   Corneal abrasion     Time reflects when diagnosis was documented in both MDM as applicable and the Disposition within this note       Time User Action Codes Description Comment    1/9/2024 11:59 PM Robbin Bunn Add [S05.00XA] Corneal abrasion           ED Disposition       ED Disposition   Discharge    Condition   Stable    Date/Time   Tue Jan 9, 2024 11:59 PM    Comment   Cristina Chong discharge to home/self care.                   Follow-up Information       Follow up With Specialties Details Why Contact Info Additional Information    Nilesh Kohler  Schedule an appointment as soon as possible for a visit   [address] [facility] [address] [phone]       [facility] Emergency Medicine  If symptoms worsen [address] [facility] [id number] [phone] [facility] Department, [address] [place], [place], [address]    [facility] Ophthalmology Schedule an appointment as soon as possible for a visit today for continued  management and care of eye [address]  [address]  [place] [address]  [phone]               Discharge Medication List as of 1/10/2024 12:05 AM        START taking these medications    Details   erythromycin (ILOTYCIN) ophthalmic ointment Administer into the left eye 4 (four) times a day Place a 1/2 inch ribbon of ointment into the lower eyelid., Starting Wed 1/10/2024, Normal           CONTINUE these medications which have NOT CHANGED    Details   methocarbamol (ROBAXIN) 500 mg tablet Take 1 tablet (500 mg total) by mouth 2 (two) times a day, Starting Sat 9/4/2021, Normal      naproxen (NAPROSYN) 500 mg tablet Take 1 tablet (500 mg total) by mouth 2 (two) times a day with meals, Starting Sat 9/4/2021, Normal      oxymetazoline (AFRIN) 0.05 % nasal spray 2 sprays by Each Nare route 2 (two) times a day, Starting Sat 11/6/2021, Print      sodium chloride (OCEAN) 0.65 % nasal spray 1 spray into each nostril as needed for congestion, Starting Sat 11/6/2021, Print             No discharge procedures on file.    PDMP Review       None            ED Provider  Electronically Signed by             Robbin Bunn PA-C  01/10/24 7699

## 2024-01-10 NOTE — DISCHARGE INSTRUCTIONS
Follow-up with ophthalmology.  Get an appointment with PCP.  Use medicine as directed.  If any symptoms worsen or new symptoms appear return to the ER.

## 2024-01-25 ENCOUNTER — HOSPITAL ENCOUNTER (EMERGENCY)
Facility: HOSPITAL | Age: 24
Discharge: HOME | End: 2024-01-25
Attending: EMERGENCY MEDICINE
Payer: COMMERCIAL

## 2024-01-25 VITALS
SYSTOLIC BLOOD PRESSURE: 135 MMHG | OXYGEN SATURATION: 99 % | RESPIRATION RATE: 18 BRPM | HEART RATE: 85 BPM | DIASTOLIC BLOOD PRESSURE: 62 MMHG | TEMPERATURE: 98.1 F

## 2024-01-25 DIAGNOSIS — Z20.2 STD EXPOSURE: ICD-10-CM

## 2024-01-25 DIAGNOSIS — M79.5 SOFT TISSUES FOREIGN BODY: Primary | ICD-10-CM

## 2024-01-25 PROCEDURE — 87591 N.GONORRHOEAE DNA AMP PROB: CPT | Performed by: EMERGENCY MEDICINE

## 2024-01-25 PROCEDURE — 87491 CHLMYD TRACH DNA AMP PROBE: CPT | Performed by: EMERGENCY MEDICINE

## 2024-01-25 PROCEDURE — 96372 THER/PROPH/DIAG INJ SC/IM: CPT

## 2024-01-25 PROCEDURE — 99284 EMERGENCY DEPT VISIT MOD MDM: CPT | Performed by: EMERGENCY MEDICINE

## 2024-01-25 PROCEDURE — 10120 INC&RMVL FB SUBQ TISS SMPL: CPT | Performed by: EMERGENCY MEDICINE

## 2024-01-25 PROCEDURE — 99283 EMERGENCY DEPT VISIT LOW MDM: CPT

## 2024-01-25 RX ORDER — DOXYCYCLINE HYCLATE 100 MG/1
100 CAPSULE ORAL ONCE
Status: COMPLETED | OUTPATIENT
Start: 2024-01-25 | End: 2024-01-25

## 2024-01-25 RX ORDER — DOXYCYCLINE HYCLATE 100 MG/1
100 CAPSULE ORAL 2 TIMES DAILY
Qty: 14 CAPSULE | Refills: 0 | Status: SHIPPED | OUTPATIENT
Start: 2024-01-25 | End: 2024-02-01

## 2024-01-25 RX ORDER — LIDOCAINE HYDROCHLORIDE 20 MG/ML
10 INJECTION, SOLUTION EPIDURAL; INFILTRATION; INTRACAUDAL; PERINEURAL ONCE
Status: COMPLETED | OUTPATIENT
Start: 2024-01-25 | End: 2024-01-25

## 2024-01-25 RX ADMIN — LIDOCAINE HYDROCHLORIDE 500 MG: 10 INJECTION, SOLUTION EPIDURAL; INFILTRATION; INTRACAUDAL; PERINEURAL at 20:14

## 2024-01-25 RX ADMIN — LIDOCAINE HYDROCHLORIDE 10 ML: 20 INJECTION, SOLUTION EPIDURAL; INFILTRATION; INTRACAUDAL at 21:22

## 2024-01-25 RX ADMIN — DOXYCYCLINE HYCLATE 100 MG: 100 CAPSULE ORAL at 20:14

## 2024-01-26 LAB
C TRACH DNA SPEC QL NAA+PROBE: NEGATIVE
N GONORRHOEA DNA SPEC QL NAA+PROBE: POSITIVE

## 2024-01-26 NOTE — ED PROVIDER NOTES
History  Chief Complaint   Patient presents with    Finger Pain     Pt reports got a piece of wood in L index finger approx 1 week ago. Pt states it is now bothering him.           Patient is a 23-year-old male no past medical history presenting for STD testing and finger pain.  Patient states 1 week ago he had a piece of wood stuck in his left index finger and notes continued pain to the area.  States that he was having purulent drainage but that resolved and it seems to have scabbed over.  Also states he wants STD testing and has 1 female partner that tested positive for both gonorrhea and chlamydia.  Denies any discharge from his penis, pain to his penis or testicles, nausea vomiting, fevers.    Prior to Admission Medications   Prescriptions Last Dose Informant Patient Reported? Taking?   erythromycin (ILOTYCIN) ophthalmic ointment   No No   Sig: Administer into the left eye 4 (four) times a day Place a 1/2 inch ribbon of ointment into the lower eyelid.   methocarbamol (ROBAXIN) 500 mg tablet   No No   Sig: Take 1 tablet (500 mg total) by mouth 2 (two) times a day   naproxen (NAPROSYN) 500 mg tablet   No No   Sig: Take 1 tablet (500 mg total) by mouth 2 (two) times a day with meals   oxymetazoline (AFRIN) 0.05 % nasal spray   No No   Si sprays by Each Nare route 2 (two) times a day   sodium chloride (OCEAN) 0.65 % nasal spray   No No   Si spray into each nostril as needed for congestion      Facility-Administered Medications: None       Past Medical History:   Diagnosis Date    Scoliosis        History reviewed. No pertinent surgical history.    History reviewed. No pertinent family history.  I have reviewed and agree with the history as documented.    E-Cigarette/Vaping    E-Cigarette Use Never User      E-Cigarette/Vaping Substances     Social History     Tobacco Use    Smoking status: Never    Smokeless tobacco: Never   Vaping Use    Vaping status: Never Used   Substance Use Topics    Alcohol use: Not  Currently    Drug use: Not Currently       Review of Systems   All other systems reviewed and are negative.      Physical Exam  Physical Exam  Vitals reviewed.   Constitutional:       General: He is not in acute distress.     Appearance: Normal appearance. He is not ill-appearing.   HENT:      Mouth/Throat:      Mouth: Mucous membranes are moist.   Eyes:      Conjunctiva/sclera: Conjunctivae normal.   Cardiovascular:      Rate and Rhythm: Normal rate and regular rhythm.   Pulmonary:      Effort: Pulmonary effort is normal.   Abdominal:      General: Abdomen is flat.      Palpations: Abdomen is soft.      Tenderness: There is no abdominal tenderness.   Musculoskeletal:         General: Tenderness present. No swelling. Normal range of motion.      Cervical back: Neck supple.      Comments: Patient has tenderness to the pulp of the left index finger distally with no fluctuance, induration, full range of motion at all joints, no significant edema   Skin:     General: Skin is warm and dry.      Capillary Refill: Capillary refill takes less than 2 seconds.   Neurological:      General: No focal deficit present.      Mental Status: He is alert.      Sensory: No sensory deficit.      Motor: No weakness.   Psychiatric:         Mood and Affect: Mood normal.         Vital Signs  ED Triage Vitals [01/25/24 1930]   Temperature Pulse Respirations Blood Pressure SpO2   98.1 °F (36.7 °C) 85 18 135/62 99 %      Temp Source Heart Rate Source Patient Position - Orthostatic VS BP Location FiO2 (%)   Temporal Monitor Sitting Left arm --      Pain Score       --           Vitals:    01/25/24 1930   BP: 135/62   Pulse: 85   Patient Position - Orthostatic VS: Sitting         Visual Acuity      ED Medications  Medications   cefTRIAXone (ROCEPHIN) 500 mg in lidocaine (PF) (XYLOCAINE-MPF) 1 % IM only syringe (500 mg Intramuscular Given 1/25/24 2014)   doxycycline hyclate (VIBRAMYCIN) capsule 100 mg (100 mg Oral Given 1/25/24 2014)   lidocaine  (PF) (XYLOCAINE-MPF) 2 % injection 10 mL (10 mL Infiltration Given by Other 1/25/24 2122)       Diagnostic Studies  Results Reviewed       Procedure Component Value Units Date/Time    Chlamydia/GC amplified DNA by PCR [[id number]]  (Abnormal) Collected: 01/25/24 2005    Lab Status: Final result Specimen: Urine, Other Updated: 01/26/24 1437     N gonorrhoeae, DNA Probe Positive     Chlamydia trachomatis, DNA Probe Negative    Narrative:      This test was performed using the FDA-approved [employer] CT/NG assay (Roche [employer]). This test uses real-time PCR to detect Chlamydia trachomatis (CT) and Neisseria gonorrhoeae (NG). This instrument and assay have been validated by the  and performing laboratory and verified by the performing laboratory.  This test is intended as an aid in the diagnosis of chlamydial and gonococcal disease. This test has not been evaluated in patients younger than 14 years of age and is not recommended for evaluation of suspected sexual abuse. This assay is not intended to replace other exams or tests for diagnosis of urogenital infection by causative factors other than Chalmydia trachomatis (CT) and Neisseria gonorrhoeae (NG). Additional testing is recommended when the results do not correlate with clinical signs and symptoms.   Procedural Limitations  This assay has only been validated for use with male and female urine, clinician-instructed self-collected vaginal swab specimens, clinician-collected vaginal swab specimens, endocervical swab specimens collected in laurent® PCR Media and cervical specimens collected in PreservCyt® Solution. Assay performance has not been validated for use with other collection media and/or specimen types.   Detection of C. trachomatis and N. gonorrhoeaea is dependent on the number of organisms present in the specimen. Detection may be affected by specimen collection methods, patient factors, stage of infection, infecting strains, and presence of  polymerase/PCR inhibitors.   When CT is present at very high concentrations, the detection of NG present at concentrations near the limit of detection may be impacted.  The presence of mucus in endocervical specimens may lead to false negative results.  The presence of whole blood in urine and cervical specimens collected in PreservCyt Solution may lead to false negative and/or invalid test results.   Urine testing is recommended to be performed on first catch urine samples. The effects of other collection variables have not been evaluated at this time. The effects of vaginal discharge, tampon use, douching, and other collection variables have not been evaluated at this time.   This assay has not been evaluated with patients currently being treated with antimicrobial agents active against CT or NG, or patients with a history of hysterectomy.                    No orders to display              Procedures  Foreign Body - Embedded    Date/Time: 1/26/2024 10:57 PM    Performed by: Kylah Mckenzie DO  Authorized by: Kylah Mckenzie DO    Patient location:  ED  Consent:     Consent obtained:  Verbal    Consent given by:  Patient    Risks discussed:  Bleeding, pain and infection  Universal protocol:     Procedure explained and questions answered to patient or proxy's satisfaction: yes      Relevant documents present and verified: yes      Test results available and properly labeled: yes      Radiology Images displayed and confirmed.  If images not available, report reviewed.: yes      Required blood products, implants, devices, and special equipment available: yes      Site/side marked: yes      Immediately prior to procedure, a time out was called: yes      Patient identity confirmed:  Verbally with patient  Location:     Location:  Finger    Finger location:  L index finger  Procedure details:     Scalpel size:  11    Incision length:  1    Localization method:  Probed    Dissection of underlying tissues: no       Bloodless field: yes      Removal mechanism:  Scalpel    Removal Method:  Open    Foreign bodies recovered:  1    Description:  Sj  Post-procedure details:     Neurovascular status: intact      Confirmation:  No additional foreign bodies on visualization    Skin closure:  None    Dressing:  Open (no dressing)    Patient tolerance of procedure:  Tolerated well, no immediate complications           ED Course  ED Course as of 01/26/24 2258   u Jan 25, 2024   2130 Splinter removed                               SBIRT 20yo+      Flowsheet Row Most Recent Value   Initial Alcohol Screen: US AUDIT-C     1. How often do you have a drink containing alcohol? 0 Filed at: 01/25/2024 1939   2. How many drinks containing alcohol do you have on a typical day you are drinking?  0 Filed at: 01/25/2024 1939   3a. Male UNDER 65: How often do you have five or more drinks on one occasion? 0 Filed at: 01/25/2024 1939   Audit-C Score 0 Filed at: 01/25/2024 1939   LETHA: How many times in the past year have you...    Used an illegal drug or used a prescription medication for non-medical reasons? Never Filed at: 01/25/2024 1939                      Medical Decision Making  Patient is a 23-year-old male no past medical history presenting with finger pain, STD testing.  Patient is well-appearing at bedside with stable vitals and in no acute distress.  Will treat for STDs as patient states his partner has tested positive for both gonorrhea and chlamydia and have ordered testing.  Have discussed with patient continued watchful waiting as body will expel embedded foreign body versus incision today and he states that he would like incision and exploration today.    Amount and/or Complexity of Data Reviewed  Labs: ordered.    Risk  Prescription drug management.             Disposition  Final diagnoses:   Soft tissues foreign body   STD exposure     Time reflects when diagnosis was documented in both MDM as applicable and the Disposition  within this note       Time User Action Codes Description Comment    1/25/2024  9:31 PM Kylah Mckenzie Add [M79.5] Soft tissues foreign body     1/25/2024  9:31 PM Kylah Mckenzie Add [Z20.2] STD exposure           ED Disposition       ED Disposition   Discharge    Condition   Stable    Date/Time   Thu Jan 25, 2024 2131    Comment   Cristina Llanesle discharge to home/self care.                   Follow-up Information       Follow up With Specialties Details Why Contact Info Additional Information    Counts include 234 beds at the [facility] Emergency Department Emergency Medicine  If symptoms worsen [address] [facility] [address]  [phone] Counts include 234 beds at the [facility] Emergency Department, [address] [place], [place], [address]            Discharge Medication List as of 1/25/2024  9:32 PM        START taking these medications    Details   doxycycline hyclate (VIBRAMYCIN) 100 mg capsule Take 1 capsule (100 mg total) by mouth 2 (two) times a day for 7 days, Starting Thu 1/25/2024, Until Thu 2/1/2024, Normal           CONTINUE these medications which have NOT CHANGED    Details   erythromycin (ILOTYCIN) ophthalmic ointment Administer into the left eye 4 (four) times a day Place a 1/2 inch ribbon of ointment into the lower eyelid., Starting Wed 1/10/2024, Normal      methocarbamol (ROBAXIN) 500 mg tablet Take 1 tablet (500 mg total) by mouth 2 (two) times a day, Starting Sat 9/4/2021, Normal      naproxen (NAPROSYN) 500 mg tablet Take 1 tablet (500 mg total) by mouth 2 (two) times a day with meals, Starting Sat 9/4/2021, Normal      oxymetazoline (AFRIN) 0.05 % nasal spray 2 sprays by Each Nare route 2 (two) times a day, Starting Sat 11/6/2021, Print      sodium chloride (OCEAN) 0.65 % nasal spray 1 spray into each nostril as needed for congestion, Starting Sat 11/6/2021, Print             No discharge procedures on file.    PDMP Review       None            ED  Provider  Electronically Signed by             Kylah Mckenzie,   01/26/24 1081

## 2024-01-27 NOTE — RESULT ENCOUNTER NOTE
Gonorrhoeae positive.   Treated in ED.  Active MyChart and results have been reviewed by the patient

## 2024-05-02 ENCOUNTER — TELEPHONE (OUTPATIENT)
Age: 24
End: 2024-05-02

## 2024-05-02 ENCOUNTER — TELEPHONE (OUTPATIENT)
Dept: BEHAVIORAL/MENTAL HEALTH CLINIC | Facility: CLINIC | Age: 24
End: 2024-05-02

## 2024-05-02 NOTE — TELEPHONE ENCOUNTER
Writer returned call about scheduling with Anna STRICKLAND on [employer].  Informed we are not accepting NP with [place] insurance and requested a call back to see if the want resource list sent.

## 2025-05-25 ENCOUNTER — APPOINTMENT (EMERGENCY)
Dept: RADIOLOGY | Facility: HOSPITAL | Age: 25
End: 2025-05-25
Payer: COMMERCIAL

## 2025-05-25 ENCOUNTER — HOSPITAL ENCOUNTER (EMERGENCY)
Facility: HOSPITAL | Age: 25
Discharge: HOME | End: 2025-05-25
Attending: EMERGENCY MEDICINE | Admitting: EMERGENCY MEDICINE
Payer: COMMERCIAL

## 2025-05-25 VITALS
OXYGEN SATURATION: 100 % | WEIGHT: 157.19 LBS | HEIGHT: 71 IN | RESPIRATION RATE: 15 BRPM | BODY MASS INDEX: 22.01 KG/M2 | SYSTOLIC BLOOD PRESSURE: 130 MMHG | HEART RATE: 59 BPM | TEMPERATURE: 98.3 F | DIASTOLIC BLOOD PRESSURE: 62 MMHG

## 2025-05-25 DIAGNOSIS — R94.31 ABNORMAL EKG: ICD-10-CM

## 2025-05-25 DIAGNOSIS — R07.9 CHEST PAIN: Primary | ICD-10-CM

## 2025-05-25 LAB
2HR DELTA HS TROPONIN: 0 NG/L
ALBUMIN SERPL BCG-MCNC: 3.9 G/DL (ref 3.5–5)
ALP SERPL-CCNC: 64 U/L (ref 34–104)
ALT SERPL W P-5'-P-CCNC: 15 U/L (ref 7–52)
ANION GAP SERPL CALCULATED.3IONS-SCNC: 10 MMOL/L (ref 4–13)
AST SERPL W P-5'-P-CCNC: 20 U/L (ref 13–39)
ATRIAL RATE: 68 BPM
ATRIAL RATE: 80 BPM
ATRIAL RATE: 93 BPM
BASOPHILS # BLD AUTO: 0.02 THOUSANDS/ÂΜL (ref 0–0.1)
BASOPHILS NFR BLD AUTO: 0 % (ref 0–1)
BILIRUB SERPL-MCNC: 0.35 MG/DL (ref 0.2–1)
BUN SERPL-MCNC: 12 MG/DL (ref 5–25)
CALCIUM SERPL-MCNC: 8.7 MG/DL (ref 8.4–10.2)
CARDIAC TROPONIN I PNL SERPL HS: 6 NG/L (ref ?–50)
CARDIAC TROPONIN I PNL SERPL HS: 6 NG/L (ref ?–50)
CARDIAC TROPONIN I PNL SERPL HS: 8 NG/L (ref ?–50)
CHLORIDE SERPL-SCNC: 101 MMOL/L (ref 96–108)
CO2 SERPL-SCNC: 26 MMOL/L (ref 21–32)
CREAT SERPL-MCNC: 0.72 MG/DL (ref 0.6–1.3)
EOSINOPHIL # BLD AUTO: 0.01 THOUSAND/ÂΜL (ref 0–0.61)
EOSINOPHIL NFR BLD AUTO: 0 % (ref 0–6)
ERYTHROCYTE [DISTWIDTH] IN BLOOD BY AUTOMATED COUNT: 12.2 % (ref 11.6–15.1)
GFR SERPL CREATININE-BSD FRML MDRD: 131 ML/MIN/1.73SQ M
GLUCOSE SERPL-MCNC: 85 MG/DL (ref 65–140)
HCT VFR BLD AUTO: 43.2 % (ref 36.5–49.3)
HGB BLD-MCNC: 14.2 G/DL (ref 12–17)
IMM GRANULOCYTES # BLD AUTO: 0.02 THOUSAND/UL (ref 0–0.2)
IMM GRANULOCYTES NFR BLD AUTO: 0 % (ref 0–2)
LIPASE SERPL-CCNC: 20 U/L (ref 11–82)
LYMPHOCYTES # BLD AUTO: 2.07 THOUSANDS/ÂΜL (ref 0.6–4.47)
LYMPHOCYTES NFR BLD AUTO: 31 % (ref 14–44)
MCH RBC QN AUTO: 26.7 PG (ref 26.8–34.3)
MCHC RBC AUTO-ENTMCNC: 32.9 G/DL (ref 31.4–37.4)
MCV RBC AUTO: 81 FL (ref 82–98)
MONOCYTES # BLD AUTO: 0.8 THOUSAND/ÂΜL (ref 0.17–1.22)
MONOCYTES NFR BLD AUTO: 12 % (ref 4–12)
NEUTROPHILS # BLD AUTO: 3.76 THOUSANDS/ÂΜL (ref 1.85–7.62)
NEUTS SEG NFR BLD AUTO: 57 % (ref 43–75)
NRBC BLD AUTO-RTO: 0 /100 WBCS
P AXIS: 74 DEGREES
P AXIS: 74 DEGREES
P AXIS: 83 DEGREES
PLATELET # BLD AUTO: 172 THOUSANDS/UL (ref 149–390)
PMV BLD AUTO: 11.9 FL (ref 8.9–12.7)
POTASSIUM SERPL-SCNC: 3.5 MMOL/L (ref 3.5–5.3)
PR INTERVAL: 124 MS
PR INTERVAL: 128 MS
PR INTERVAL: 136 MS
PROT SERPL-MCNC: 7.2 G/DL (ref 6.4–8.4)
QRS AXIS: 80 DEGREES
QRS AXIS: 80 DEGREES
QRS AXIS: 85 DEGREES
QRSD INTERVAL: 86 MS
QRSD INTERVAL: 88 MS
QRSD INTERVAL: 94 MS
QT INTERVAL: 330 MS
QT INTERVAL: 334 MS
QT INTERVAL: 344 MS
QTC INTERVAL: 365 MS
QTC INTERVAL: 380 MS
QTC INTERVAL: 415 MS
RBC # BLD AUTO: 5.31 MILLION/UL (ref 3.88–5.62)
SODIUM SERPL-SCNC: 137 MMOL/L (ref 135–147)
T WAVE AXIS: -54 DEGREES
T WAVE AXIS: -67 DEGREES
T WAVE AXIS: -89 DEGREES
VENTRICULAR RATE: 68 BPM
VENTRICULAR RATE: 80 BPM
VENTRICULAR RATE: 93 BPM
WBC # BLD AUTO: 6.68 THOUSAND/UL (ref 4.31–10.16)

## 2025-05-25 PROCEDURE — 83690 ASSAY OF LIPASE: CPT | Performed by: EMERGENCY MEDICINE

## 2025-05-25 PROCEDURE — 71046 X-RAY EXAM CHEST 2 VIEWS: CPT

## 2025-05-25 PROCEDURE — 99285 EMERGENCY DEPT VISIT HI MDM: CPT | Performed by: EMERGENCY MEDICINE

## 2025-05-25 PROCEDURE — 93005 ELECTROCARDIOGRAM TRACING: CPT

## 2025-05-25 PROCEDURE — 80053 COMPREHEN METABOLIC PANEL: CPT | Performed by: EMERGENCY MEDICINE

## 2025-05-25 PROCEDURE — 96361 HYDRATE IV INFUSION ADD-ON: CPT

## 2025-05-25 PROCEDURE — 84484 ASSAY OF TROPONIN QUANT: CPT | Performed by: EMERGENCY MEDICINE

## 2025-05-25 PROCEDURE — 36415 COLL VENOUS BLD VENIPUNCTURE: CPT | Performed by: EMERGENCY MEDICINE

## 2025-05-25 PROCEDURE — 96360 HYDRATION IV INFUSION INIT: CPT

## 2025-05-25 PROCEDURE — 93010 ELECTROCARDIOGRAM REPORT: CPT | Performed by: INTERNAL MEDICINE

## 2025-05-25 PROCEDURE — 85025 COMPLETE CBC W/AUTO DIFF WBC: CPT | Performed by: EMERGENCY MEDICINE

## 2025-05-25 PROCEDURE — 99285 EMERGENCY DEPT VISIT HI MDM: CPT

## 2025-05-25 RX ORDER — SODIUM CHLORIDE 9 MG/ML
3 INJECTION INTRAVENOUS
Status: DISCONTINUED | OUTPATIENT
Start: 2025-05-25 | End: 2025-05-25 | Stop reason: HOSPADM

## 2025-05-25 RX ORDER — LIDOCAINE HYDROCHLORIDE 20 MG/ML
15 SOLUTION OROPHARYNGEAL ONCE
Status: COMPLETED | OUTPATIENT
Start: 2025-05-25 | End: 2025-05-25

## 2025-05-25 RX ORDER — MAGNESIUM HYDROXIDE/ALUMINUM HYDROXICE/SIMETHICONE 120; 1200; 1200 MG/30ML; MG/30ML; MG/30ML
30 SUSPENSION ORAL ONCE
Status: COMPLETED | OUTPATIENT
Start: 2025-05-25 | End: 2025-05-25

## 2025-05-25 RX ADMIN — SODIUM CHLORIDE 1000 ML: 0.9 INJECTION, SOLUTION INTRAVENOUS at 01:45

## 2025-05-25 RX ADMIN — ALUMINUM HYDROXIDE, MAGNESIUM HYDROXIDE, AND DIMETHICONE 30 ML: 200; 20; 200 SUSPENSION ORAL at 01:44

## 2025-05-25 RX ADMIN — LIDOCAINE HYDROCHLORIDE 15 ML: 20 SOLUTION ORAL at 01:44

## 2025-05-25 NOTE — ED PROVIDER NOTES
Time reflects when diagnosis was documented in both MDM as applicable and the Disposition within this note       Time User Action Codes Description Comment    5/25/2025  5:13 AM Sandra Jessica Add [R07.9] Chest pain     5/25/2025  5:13 AM Sandra Jessica Add [R94.31] Abnormal EKG           ED Disposition       ED Disposition   Discharge    Condition   Stable    Date/Time   Sun May 25, 2025  4:45 AM    Comment   Cristina Chong discharge to home/self care.                   Assessment & Plan       Medical Decision Making  25 y/o male with chest pain- will get cardiac workup, lipase, and give GI cocktail. Will reassess for dispo     Case signed out to Dr Gillespie, pending trop and re-eval.     Amount and/or Complexity of Data Reviewed  Labs: ordered.  Radiology: ordered.    Risk  OTC drugs.  Prescription drug management.        ED Course as of 05/28/25 1331   [place] May 25, 2025   0114 N/v/d hursday and Friday which resolved.felt better yesterday but ate pizza and tehn got achy phani pain. No radiation. Nothing worsens or improves.    0507 Spoke with Dr Davenport from cardiology who recommends getting another trop to rule out myopericarditis.  He states that if that is negative, he can be discharged.       Medications   sodium chloride 0.9 % bolus 1,000 mL (0 mL Intravenous Stopped 5/25/25 0345)   aluminum-magnesium hydroxide-simethicone (MAALOX) oral suspension 30 mL (30 mL Oral Given 5/25/25 0144)   Lidocaine Viscous HCl (XYLOCAINE) 2 % mucosal solution 15 mL (15 mL Swish & Swallow Given 5/25/25 0144)       ED Risk Strat Scores   HEART Risk Score      Flowsheet Row Most Recent Value   Heart Score Risk Calculator    History 0 Filed at: 05/25/2025 0147   ECG 1 Filed at: 05/25/2025 0147   Age 0 Filed at: 05/25/2025 0147   Risk Factors 1 Filed at: 05/25/2025 0147   Troponin 0 Filed at: 05/25/2025 0147   HEART Score 2 Filed at: 05/25/2025 0147          HEART Risk Score      Flowsheet Row Most Recent Value   Heart Score  Risk Calculator    History 0 Filed at: 05/25/2025 0147   ECG 1 Filed at: 05/25/2025 0147   Age 0 Filed at: 05/25/2025 0147   Risk Factors 1 Filed at: 05/25/2025 0147   Troponin 0 Filed at: 05/25/2025 0147   HEART Score 2 Filed at: 05/25/2025 0147                      No data recorded        SBIRT 20yo+      Flowsheet Row Most Recent Value   Initial Alcohol Screen: US AUDIT-C     1. How often do you have a drink containing alcohol? 0 Filed at: 05/25/2025 0102   2. How many drinks containing alcohol do you have on a typical day you are drinking?  0 Filed at: 05/25/2025 0102   3a. Male UNDER 65: How often do you have five or more drinks on one occasion? 0 Filed at: 05/25/2025 0102   3b. FEMALE Any Age, or MALE 65+: How often do you have 4 or more drinks on one occassion? 0 Filed at: 05/25/2025 0102   Audit-C Score 0 Filed at: 05/25/2025 0102   LETHA: How many times in the past year have you...    Used an illegal drug or used a prescription medication for non-medical reasons? Never Filed at: 05/25/2025 0102                            History of Present Illness       Chief Complaint   Patient presents with    Chest Pain     Pt reports chest pain that started this morning that hasn't gone away. Pt reports midsternal chest pain with no radiation. Pt denies cardiac hx, reports having stomach bug 2 days ago and vomiting a lot. Pt reports eating today for the first time.        Past Medical History[1]   Past Surgical History[2]   Family History[3]   Social History[4]   E-Cigarette/Vaping    E-Cigarette Use Never User       E-Cigarette/Vaping Substances      I have reviewed and agree with the history as documented.     23 y/o male presents to the ED for chest pain x 1 day. He states that 2 days ago he had n/v/d. States that those symptoms resolved yesterday morning and he felt better so he ate pizza. States that after eating he developed achy pain in the center of his chest. States that it has been constant since. Denies any  sob, n/v, abd pain, cough, d/c, or urianry symptoms. No hx of similar. Denies any cardiac hx or family hx of cardiac dz. Has not tried anything for the symptoms. No other complaints.       History provided by:  Patient  Chest Pain  Pain location:  Substernal area  Pain quality: aching    Pain radiates to:  Does not radiate  Pain radiates to the back: no    Timing:  Constant  Chronicity:  New  Relieved by:  None tried  Worsened by:  Nothing tried  Ineffective treatments:  None tried  Associated symptoms: no abdominal pain, no back pain, no cough, no fever, no headache, no nausea, no numbness, no shortness of breath, not vomiting and no weakness        Review of Systems   Constitutional:  Negative for chills and fever.   HENT:  Negative for congestion, ear pain and sore throat.    Eyes:  Negative for pain and visual disturbance.   Respiratory:  Negative for cough, shortness of breath and wheezing.    Cardiovascular:  Positive for chest pain. Negative for leg swelling.   Gastrointestinal:  Negative for abdominal pain, diarrhea, nausea and vomiting.   Genitourinary:  Negative for dysuria, frequency, hematuria and urgency.   Musculoskeletal:  Negative for back pain, neck pain and neck stiffness.   Skin:  Negative for rash and wound.   Neurological:  Negative for weakness, numbness and headaches.   Psychiatric/Behavioral:  Negative for agitation and confusion.    All other systems reviewed and are negative.          Objective       ED Triage Vitals   Temperature Pulse Blood Pressure Respirations SpO2 Patient Position - Orthostatic VS   05/25/25 0058 05/25/25 0058 05/25/25 0058 05/25/25 0058 05/25/25 0058 05/25/25 0058   98.3 °F (36.8 °C) 81 122/69 21 99 % Sitting      Temp Source Heart Rate Source BP Location FiO2 (%) Pain Score    05/25/25 0058 05/25/25 0058 05/25/25 0058 -- 05/25/25 0102    Temporal Monitor Left arm  9      Vitals      Date and Time Temp Pulse SpO2 Resp BP Pain Score FACES Pain Rating User   05/25/25  0642 -- 59 -- 15 130/62 -- -- KT   05/25/25 0642 -- -- 100 % -- -- No Pain -- SM   05/25/25 0611 -- 70 -- 17 106/53 -- -- KT   05/25/25 0530 -- 82 -- 17 130/73 -- -- KT   05/25/25 0500 -- 67 -- 16 114/57 -- --    05/25/25 0430 -- 69 -- 17 135/76 -- --    05/25/25 0400 -- 66 100 % 18 115/59 -- --    05/25/25 0331 -- 80 98 % 20 129/77 -- --    05/25/25 0200 -- 73 99 % 14 125/72 -- --    05/25/25 0143 -- 81 99 % 15 130/58 -- --    05/25/25 0102 -- -- -- -- -- 9 -- JR   05/25/25 0058 98.3 °F (36.8 °C) 81 99 % 21 122/69 -- -- NO            Physical Exam  Vitals and nursing note reviewed.   Constitutional:       Appearance: He is well-developed.   HENT:      Head: Normocephalic and atraumatic.     Eyes:      Pupils: Pupils are equal, round, and reactive to light.       Cardiovascular:      Rate and Rhythm: Normal rate and regular rhythm.   Pulmonary:      Effort: Pulmonary effort is normal.      Breath sounds: Normal breath sounds.   Abdominal:      General: Bowel sounds are normal.      Palpations: Abdomen is soft.     Musculoskeletal:         General: Normal range of motion.      Cervical back: Normal range of motion and neck supple.     Skin:     General: Skin is warm and dry.     Neurological:      General: No focal deficit present.      Mental Status: He is alert and oriented to person, place, and time.      Comments: No focal deficits       Results Reviewed       Procedure Component Value Units Date/Time    HS Troponin 0hr (reflex protocol) [[id number]]  (Normal) Collected: 05/25/25 0631    Lab Status: Final result Specimen: Blood from Arm, Right Updated: 05/25/25 0704     hs TnI 0hr 8 ng/L     HS Troponin I 2hr [[id number]]  (Normal) Collected: 05/25/25 0411    Lab Status: Final result Specimen: Blood from Arm, Left Updated: 05/25/25 0440     hs TnI 2hr 6 ng/L      Delta 2hr hsTnI 0 ng/L     HS Troponin 0hr (reflex protocol) [[id number]]  (Normal) Collected: 05/25/25 0233    Lab Status: Final result  Specimen: Blood from Arm, Right Updated: 05/25/25 0257     hs TnI 0hr 6 ng/L     Comprehensive metabolic panel [[id number]] Collected: 05/25/25 0140    Lab Status: Final result Specimen: Blood Updated: 05/25/25 0249     Sodium 137 mmol/L      Potassium 3.5 mmol/L      Chloride 101 mmol/L      CO2 26 mmol/L      ANION GAP 10 mmol/L      BUN 12 mg/dL      Creatinine 0.72 mg/dL      Glucose 85 mg/dL      Calcium 8.7 mg/dL      AST 20 U/L      ALT 15 U/L      Alkaline Phosphatase 64 U/L      Total Protein 7.2 g/dL      Albumin 3.9 g/dL      Total Bilirubin 0.35 mg/dL      eGFR 131 ml/min/1.73sq m     Narrative:      [employer] guidelines for Chronic Kidney Disease (CKD):     Stage 1 with normal or high GFR (GFR > 90 mL/min/1.73 square meters)    Stage 2 Mild CKD (GFR = 60-89 mL/min/1.73 square meters)    Stage 3A Moderate CKD (GFR = 45-59 mL/min/1.73 square meters)    Stage 3B Moderate CKD (GFR = 30-44 mL/min/1.73 square meters)    Stage 4 Severe CKD (GFR = 15-29 mL/min/1.73 square meters)    Stage 5 End Stage CKD (GFR <15 mL/min/1.73 square meters)  Note: GFR calculation is accurate only with a steady state creatinine    Lipase [[id number]]  (Normal) Collected: 05/25/25 0140    Lab Status: Final result Specimen: Blood Updated: 05/25/25 0249     Lipase 20 u/L     CBC and differential [[id number]]  (Abnormal) Collected: 05/25/25 0233    Lab Status: Final result Specimen: Blood from Arm, Right Updated: 05/25/25 0237     WBC 6.68 Thousand/uL      RBC 5.31 Million/uL      Hemoglobin 14.2 g/dL      Hematocrit 43.2 %      MCV 81 fL      MCH 26.7 pg      MCHC 32.9 g/dL      RDW 12.2 %      MPV 11.9 fL      Platelets 172 Thousands/uL      nRBC 0 /100 WBCs      Segmented % 57 %      Immature Grans % 0 %      Lymphocytes % 31 %      Monocytes % 12 %      Eosinophils Relative 0 %      Basophils Relative 0 %      Absolute Neutrophils 3.76 Thousands/µL      Absolute Immature Grans 0.02 Thousand/uL      Absolute  Lymphocytes 2.07 Thousands/µL      Absolute Monocytes 0.80 Thousand/µL      Eosinophils Absolute 0.01 Thousand/µL      Basophils Absolute 0.02 Thousands/µL             X-ray chest 2 views   Final Interpretation by Araceli Zamora MD ( [id number])      No acute cardiopulmonary disease.            Workstation performed: [id number]             ECG 12 Lead Documentation Only    Date/Time: 2025 1:46 AM    Performed by: Sandra Jessica DO  Authorized by: Sandra Jessica DO    Indications / Diagnosis:  Chest pain  Patient location:  ED  Rate:     ECG rate:  80    ECG rate assessment: normal    Rhythm:     Rhythm: sinus rhythm    Ectopy:     Ectopy: none    QRS:     QRS axis:  Normal    QRS intervals:  Normal  ST segments:     ST segments:  Normal  T waves:     T waves: inverted      Inverted:  II, III, aVF, V3, V4 and V5      ED Medication and Procedure Management   Prior to Admission Medications   Prescriptions Last Dose Informant Patient Reported? Taking?   erythromycin (ILOTYCIN) ophthalmic ointment   No No   Sig: Administer into the left eye 4 (four) times a day Place a 1/2 inch ribbon of ointment into the lower eyelid.   methocarbamol (ROBAXIN) 500 mg tablet   No No   Sig: Take 1 tablet (500 mg total) by mouth 2 (two) times a day   naproxen (NAPROSYN) 500 mg tablet   No No   Sig: Take 1 tablet (500 mg total) by mouth 2 (two) times a day with meals   oxymetazoline (AFRIN) 0.05 % nasal spray   No No   Si sprays by Each Nare route 2 (two) times a day   sodium chloride (OCEAN) 0.65 % nasal spray   No No   Si spray into each nostril as needed for congestion      Facility-Administered Medications: None     Discharge Medication List as of 2025  6:44 AM        CONTINUE these medications which have NOT CHANGED    Details   erythromycin (ILOTYCIN) ophthalmic ointment Administer into the left eye 4 (four) times a day Place a 1/2 inch ribbon of ointment into the lower eyelid., Starting Wed 1/10/2024, Normal       methocarbamol (ROBAXIN) 500 mg tablet Take 1 tablet (500 mg total) by mouth 2 (two) times a day, Starting Sat 9/4/2021, Normal      naproxen (NAPROSYN) 500 mg tablet Take 1 tablet (500 mg total) by mouth 2 (two) times a day with meals, Starting Sat 9/4/2021, Normal      oxymetazoline (AFRIN) 0.05 % nasal spray 2 sprays by Each Nare route 2 (two) times a day, Starting Sat 11/6/2021, Print      sodium chloride (OCEAN) 0.65 % nasal spray 1 spray into each nostril as needed for congestion, Starting Sat 11/6/2021, Print             ED SEPSIS DOCUMENTATION   Time reflects when diagnosis was documented in both MDM as applicable and the Disposition within this note       Time User Action Codes Description Comment    5/25/2025  5:13 AM Sandra Jessica Add [R07.9] Chest pain     5/25/2025  5:13 AM Sandra Jessica Add [R94.31] Abnormal EKG                    [1]   Past Medical History:  Diagnosis Date    Scoliosis    [2] No past surgical history on file.  [3] No family history on file.  [4]   Social History  Tobacco Use    Smoking status: Never    Smokeless tobacco: Never   Vaping Use    Vaping status: Never Used   Substance Use Topics    Alcohol use: Yes     Comment: occ    Drug use: Not Currently        Sandra Jessica DO  05/28/25 2787

## 2025-05-25 NOTE — DISCHARGE INSTRUCTIONS
Follow-up with cardiology  Return increasing pain, shortness of breath, worsening symptoms or any problems

## 2025-05-25 NOTE — ED RE-EVALUATION NOTE
I spoke with The patient 24-year-old male, presented with chest pain, now resolved.  Patient reports no symptoms currently.  EKGs do show ST-T wave depressions and T wave inversions diffusely.  Cardiac troponins x 3 show no sign of cardiac injury.  The off going provider did speak with cardiology.  They were okay with discharging the patient and he will follow-up as an outpatient.  Discussed with patient if his symptoms return can use Maalox or over-the-counter antiacids.  Again currently is asymptomatic.  Vital signs are normal.  Nontoxic and normal in appearance.     Ion Gillespie MD  05/25/25 2745